# Patient Record
Sex: MALE | Race: BLACK OR AFRICAN AMERICAN | Employment: FULL TIME | URBAN - METROPOLITAN AREA
[De-identification: names, ages, dates, MRNs, and addresses within clinical notes are randomized per-mention and may not be internally consistent; named-entity substitution may affect disease eponyms.]

---

## 2017-01-30 ENCOUNTER — COMMUNICATION - HEALTHEAST (OUTPATIENT)
Dept: TELEHEALTH | Facility: CLINIC | Age: 27
End: 2017-01-30

## 2017-01-30 ENCOUNTER — OFFICE VISIT - HEALTHEAST (OUTPATIENT)
Dept: INTERNAL MEDICINE | Facility: CLINIC | Age: 27
End: 2017-01-30

## 2017-01-30 ENCOUNTER — COMMUNICATION - HEALTHEAST (OUTPATIENT)
Dept: INTERNAL MEDICINE | Facility: CLINIC | Age: 27
End: 2017-01-30

## 2017-01-30 DIAGNOSIS — G89.29 CHRONIC MIDLINE LOW BACK PAIN WITH LEFT-SIDED SCIATICA: ICD-10-CM

## 2017-01-30 DIAGNOSIS — M54.42 CHRONIC MIDLINE LOW BACK PAIN WITH LEFT-SIDED SCIATICA: ICD-10-CM

## 2017-01-30 DIAGNOSIS — Z72.0 TOBACCO USE: ICD-10-CM

## 2017-01-30 DIAGNOSIS — Z13.1 DIABETES MELLITUS SCREENING: ICD-10-CM

## 2017-01-30 ASSESSMENT — MIFFLIN-ST. JEOR: SCORE: 1827.38

## 2019-03-14 ENCOUNTER — THERAPY VISIT (OUTPATIENT)
Dept: PHYSICAL THERAPY | Facility: CLINIC | Age: 29
End: 2019-03-14
Payer: COMMERCIAL

## 2019-03-14 ENCOUNTER — OFFICE VISIT (OUTPATIENT)
Dept: FAMILY MEDICINE | Facility: CLINIC | Age: 29
End: 2019-03-14
Payer: COMMERCIAL

## 2019-03-14 VITALS
TEMPERATURE: 98.2 F | WEIGHT: 201 LBS | OXYGEN SATURATION: 96 % | HEART RATE: 76 BPM | SYSTOLIC BLOOD PRESSURE: 119 MMHG | HEIGHT: 67 IN | BODY MASS INDEX: 31.55 KG/M2 | DIASTOLIC BLOOD PRESSURE: 71 MMHG | RESPIRATION RATE: 16 BRPM

## 2019-03-14 DIAGNOSIS — M54.41 CHRONIC RIGHT-SIDED LOW BACK PAIN WITH RIGHT-SIDED SCIATICA: ICD-10-CM

## 2019-03-14 DIAGNOSIS — G89.29 CHRONIC RIGHT-SIDED LOW BACK PAIN WITH RIGHT-SIDED SCIATICA: ICD-10-CM

## 2019-03-14 DIAGNOSIS — M54.41 CHRONIC RIGHT-SIDED LOW BACK PAIN WITH RIGHT-SIDED SCIATICA: Primary | ICD-10-CM

## 2019-03-14 DIAGNOSIS — G89.29 CHRONIC RIGHT-SIDED LOW BACK PAIN WITH RIGHT-SIDED SCIATICA: Primary | ICD-10-CM

## 2019-03-14 PROCEDURE — 97110 THERAPEUTIC EXERCISES: CPT | Mod: GP | Performed by: PHYSICAL THERAPIST

## 2019-03-14 PROCEDURE — 97161 PT EVAL LOW COMPLEX 20 MIN: CPT | Mod: GP | Performed by: PHYSICAL THERAPIST

## 2019-03-14 PROCEDURE — 99203 OFFICE O/P NEW LOW 30 MIN: CPT | Performed by: PHYSICIAN ASSISTANT

## 2019-03-14 RX ORDER — METHYLPREDNISOLONE 4 MG
TABLET, DOSE PACK ORAL
Qty: 21 TABLET | Refills: 0 | Status: SHIPPED | OUTPATIENT
Start: 2019-03-14 | End: 2019-09-05

## 2019-03-14 SDOH — HEALTH STABILITY: MENTAL HEALTH: HOW OFTEN DO YOU HAVE A DRINK CONTAINING ALCOHOL?: NEVER

## 2019-03-14 ASSESSMENT — MIFFLIN-ST. JEOR: SCORE: 1834.32

## 2019-03-14 NOTE — NURSING NOTE
"Chief Complaint   Patient presents with     Back Pain     initial /71 (BP Location: Right arm, Cuff Size: Adult Large)   Pulse 76   Temp 98.2  F (36.8  C) (Oral)   Resp 16   Ht 1.708 m (5' 7.25\")   Wt 91.2 kg (201 lb)   SpO2 96%   BMI 31.25 kg/m   Estimated body mass index is 31.25 kg/m  as calculated from the following:    Height as of this encounter: 1.708 m (5' 7.25\").    Weight as of this encounter: 91.2 kg (201 lb).  BP completed using cuff size: large.  R arm      Health Maintenance that is potentially due pending provider review:  NONE    n/a    Jarett Limon ma  "

## 2019-03-14 NOTE — PROGRESS NOTES
SUBJECTIVE:   Shilo Riddle is a 28 year old male who presents to clinic today for the following health issues:      Back Pain       Duration: years ago        Specific cause: lifting    Description:   Location of pain: low back right  Character of pain: sharp, stabbing and waxing and waning  Pain radiation:radiates into the right leg  New numbness or weakness in legs, not attributed to pain:  YES    Intensity: Currently 7/10, moderate    History:   Pain interferes with job: YES,   History of back problems: recurrent self limited episodes of low back pain in the past  Any previous MRI or X-rays: None  Sees a specialist for back pain:  No  Therapies tried without relief: acetaminophen (Tylenol)    Alleviating factors:   Improved by: rest      Precipitating factors:  Worsened by: Standing and Sitting          Accompanying Signs & Symptoms:  Risk of Fracture:  None  Risk of Cauda Equina:  None  Risk of Infection:  None  Risk of Cancer:  None  Risk of Ankylosing Spondylitis:  Onset at age <35, male, AND morning back stiffness. no           problem list and histories reviewed & adjusted, as indicated.  Additional history: 27 y/o NP male here for evaluation of ongoing low back pain.  This started after a work injury at a job where he did lift wrong.  He never returned to that job.  It did seem to improved with time.  At the end of 2017, he did get evaluated for his low back.  Was given back brace, but not much else was done.  He has not seen anyone else for this.      Over the last few weeks, his low back pain has been building.  Does lift, twist, turn and do overhead lifting through work.  He has done a bit of tylenol, and mild massage.  He has left early on several visit through work.      Pain is 90% on right side.  Pain can radiate past knee sometimes, but not usually.    BP Readings from Last 3 Encounters:   03/14/19 119/71    Wt Readings from Last 3 Encounters:   03/14/19 91.2 kg (201 lb)                 "    Reviewed and updated as needed this visit by clinical staff  Tobacco  Allergies  Meds  Soc Hx      Reviewed and updated as needed this visit by Provider         ROS:  Constitutional, HEENT, cardiovascular, pulmonary, gi and gu systems are negative, except as otherwise noted.    OBJECTIVE:     /71 (BP Location: Right arm, Cuff Size: Adult Large)   Pulse 76   Temp 98.2  F (36.8  C) (Oral)   Resp 16   Ht 1.708 m (5' 7.25\")   Wt 91.2 kg (201 lb)   SpO2 96%   BMI 31.25 kg/m    Body mass index is 31.25 kg/m .  GENERAL: alert and no distress  EYES: Eyes grossly normal to inspection  RESP: lungs clear to auscultation - no rales, rhonchi or wheezes  CV: regular rate and rhythm, normal S1 S2, no S3 or S4, no murmur, click or rub, no peripheral edema and peripheral pulses strong  MS: limited active ROM in lumbar spine with flexion and rotation.  Mild para pumar tenderness right side into piriformis.  Negative straight leg raise.  Grossly normal sensation and strength distally    Diagnostic Test Results:  none     ASSESSMENT/PLAN:             1. Chronic right-sided low back pain with right-sided sciatica  Work note provided.  Will trial medrol burst to help with acute pain, and will get him into PHYSICAL THERAPY for for evaluation and treatment.  Anticipate [place] paperwork.  - [facility] PT, HAND, AND CHIROPRACTIC REFERRAL; Future  - methylPREDNISolone (MEDROL DOSEPAK) 4 MG tablet therapy pack; Follow Package Directions  Dispense: 21 tablet; Refill: 0        Jordon Flores PA-C  [facility]    "

## 2019-03-14 NOTE — PROGRESS NOTES
[facility] Initial Evaluation  Subjective:  The history is provided by the patient. No  was used.   Shilo Riddle is a 28 year old male with a lumbar condition.  Condition occurred with:  Lifting.  Condition occurred: at work.  This is a chronic condition  Patient originally injured back lifting a heavy box, but that was 5 years ago.  He did not return to that job though and this is not work comp.  The pain has continued, but varied over the past 5 years.  It worsened last night.  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  It also has forced overtime.  It involves constant movement and lifting.  So he is trying to get set up with an MD and treatment for his back.  He mentions that his mom and brother both just had back surgery and he's wondering if back problems can be hereditary.     As for exercise, he occasionally lifts upper body and walks on TM, but not real consistently lately.  He also works on his abs..    Patient reports pain:  Lumbar spine right.  Radiates to:  Gluteals right, lower leg right, knee right and foot right.  Pain is described as sharp and is intermittent Pain Scale: 7-10/10.     Symptoms are exacerbated by lifting, carrying, twisting and walking (constant pain while standing, lying without pillows under his legs) and relieved by NSAID's (stretching into flexion).  Since onset symptoms are gradually worsening.  Special testing: none yet.  Previous treatment: Hasn't tried any therapies yet.    General health as reported by patient is good.                                              Objective:  Standing Alignment:        Lumbar:  Lordosis decr  Pelvic:  Normal    Knee:  Genu varus R and genu varus L (does not like to fully extend knees in standing)      Gait:    Gait Type:  Antalgic   Assistive Devices:  None  Deviations:  Lumbar:  Trunk flexionKnee:  Knee extension decr R and knee  extension decr LGeneral Deviations:  Apurva decr    Flexibility/Screens:       Lower Extremity:  Decreased left lower extremity flexibility:Piriformis; Hamstrings; Gastroc and Soleus    Decreased right lower extremity flexibility:  Piriformis; Hamstrings; Gastroc and Soleus               Lumbar/SI Evaluation  ROM:    AROM Lumbar:   Flexion:          Ankles. slow return  Ext:                    25%++   Side Bend:        Left:     Right:   Rotation:           Left:     Right:   Side Glide:        Left:  50%    Right:  50%          Lumbar Myotomes:  normal            Lumbar DTR's:    L4 (Quad):  Left:  1   Right:  1  S1 (Achilles):  Left:  2   Right:  2      Neural Tension/Mobility:      Left side:SLR  negative.   Right side:   SLR positive.  Lumbar Palpation:      Tenderness not present at Left:    Quadratus Lumborum or Erector Spinae  Tenderness present at Right: Erector Spinae  Tenderness not present at Right:  Quadratus Lumborum                                                     General     ROS    Assessment/Plan:    Patient is a 28 year old male with lumbar complaints.    Patient has the following significant findings with corresponding treatment plan.                Diagnosis 1:  R LBP with right sciatica  Pain -  hot/cold therapy, manual therapy, self management, education and home program  Decreased ROM/flexibility - manual therapy, therapeutic exercise and home program  Impaired gait - gait training and home program  Impaired muscle performance - neuro re-education and home program  Decreased function - therapeutic activities and home program  Impaired posture - neuro re-education and home program    Therapy Evaluation Codes:   1) History comprised of:   Personal factors that impact the plan of care:      None.    Comorbidity factors that impact the plan of care are:      None.     Medications impacting care: None.  2) Examination of Body Systems comprised of:   Body structures and functions that impact the  plan of care:      Lumbar spine.   Activity limitations that impact the plan of care are:      Lifting, Standing, Walking, Working and Sleeping.  3) Clinical presentation characteristics are:   Evolving/Changing.  4) Decision-Making    Low complexity using standardized patient assessment instrument and/or measureable assessment of functional outcome.  Cumulative Therapy Evaluation is: Low complexity.    Previous and current functional limitations:  (See Goal Flow Sheet for this information)    Short term and Long term goals: (See Goal Flow Sheet for this information)     Communication ability:  Patient appears to be able to clearly communicate and understand verbal and written communication and follow directions correctly.  Treatment Explanation - The following has been discussed with the patient:   RX ordered/plan of care  Anticipated outcomes  Possible risks and side effects  This patient would benefit from PT intervention to resume normal activities.   Rehab potential is good.    Frequency:  1 X week, once daily  Duration:  for 4 weeks tapering to 2 X a month over 8 weeks  Discharge Plan:  Achieve all LTG.  Independent in home treatment program.  Reach maximal therapeutic benefit.    Please refer to the daily flowsheet for treatment today, total treatment time and time spent performing 1:1 timed codes.

## 2019-03-14 NOTE — LETTER
[facility]  [address]  [facility] [address]  Phone: [phone]    March 14, 2019        Shilo Riddle  [address]  [facility] [address]          To whom it may concern:    RE: Shilo Riddle    Patient was seen and treated today at our clinic and missed work 3/14.  We will also be filling out intermittent FMLA when we get a copy of the paperwork    Please contact me for questions or concerns.      Sincerely,        Jordon Flores PA-C

## 2019-03-15 ENCOUNTER — TELEPHONE (OUTPATIENT)
Dept: FAMILY MEDICINE | Facility: CLINIC | Age: 29
End: 2019-03-15

## 2019-03-15 NOTE — TELEPHONE ENCOUNTER
Forms received from [facility] for completion and signature  Placed forms:  In your box  Forms need to be faxed and picked up [phone]      Patient call? Yes  Copy sent to scanning? yes    All.JULIA Tran

## 2019-03-20 NOTE — PROGRESS NOTES
[facility] Initial Evaluation  Subjective:                                       Past medical history: Numbness/tingling.          Employment status: .  Primary job tasks include:  Lifting, operating a machine, prolonged standing and repetitive tasks (Pushing/pulling).                                Objective:  System    Physical Exam    General     ROS    Assessment/Plan:

## 2019-03-22 NOTE — TELEPHONE ENCOUNTER
Notified patient that form is completed and has been faxed. Left form at  for patient   Sissy Gao CMA on 3/22/2019 at 8:49 AM

## 2019-03-23 ENCOUNTER — THERAPY VISIT (OUTPATIENT)
Dept: PHYSICAL THERAPY | Facility: CLINIC | Age: 29
End: 2019-03-23
Payer: COMMERCIAL

## 2019-03-23 DIAGNOSIS — M54.41 CHRONIC RIGHT-SIDED LOW BACK PAIN WITH RIGHT-SIDED SCIATICA: ICD-10-CM

## 2019-03-23 DIAGNOSIS — G89.29 CHRONIC RIGHT-SIDED LOW BACK PAIN WITH RIGHT-SIDED SCIATICA: ICD-10-CM

## 2019-03-23 PROCEDURE — 97140 MANUAL THERAPY 1/> REGIONS: CPT | Mod: GP | Performed by: PHYSICAL THERAPIST

## 2019-03-23 PROCEDURE — 97110 THERAPEUTIC EXERCISES: CPT | Mod: GP | Performed by: PHYSICAL THERAPIST

## 2019-04-30 PROBLEM — M54.41 CHRONIC RIGHT-SIDED LOW BACK PAIN WITH RIGHT-SIDED SCIATICA: Status: RESOLVED | Noted: 2019-03-14 | Resolved: 2019-04-30

## 2019-04-30 PROBLEM — G89.29 CHRONIC RIGHT-SIDED LOW BACK PAIN WITH RIGHT-SIDED SCIATICA: Status: RESOLVED | Noted: 2019-03-14 | Resolved: 2019-04-30

## 2019-04-30 NOTE — PROGRESS NOTES
Subjective:  HPI                    Objective:  System    Physical Exam    General     ROS    Assessment/Plan:    DISCHARGE REPORT    Progress reporting period is from 3/14/19 to 3/23/19.       SUBJECTIVE  Subjective changes noted by patient: Patient last seen on 3/23/19.  Patient reports he still gets the fire pain in the back after 6 hours into his work shift.  He has been doing some of his stretches in the locker room at work and really likes the rotation stretch.  He mentions that his moms back issue was a cyst in her back and he says he is built like her and with a large back side and it seems to force him to walk with lots of tension in his back.    Current Pain level: (7-10/10).     Initial Pain level: (7-10/10).   Changes in function:  None  Adverse reaction to treatment or activity: activity - working    OBJECTIVE  Changes noted in objective findings:  Patient has failed to return to therapy so current objective findings are unknown.    Still 9/10 pain with standing/walking>30 min.    Walks holding upper body VERY stiff and slight lean forward.     ASSESSMENT/PLAN  Updated problem list and treatment plan: Diagnosis 1:  R LBP with right sciatica  Pain -  hot/cold therapy, manual therapy, self management, education, directional preference exercise and home program  Decreased ROM/flexibility - manual therapy, therapeutic exercise and home program  Decreased strength - therapeutic exercise, therapeutic activities and home program  Impaired muscle performance - neuro re-education and home program  Decreased function - therapeutic activities and home program  Impaired posture - neuro re-education and home program  STG/LTGs have been met or progress has been made towards goals:    Assessment of Progress: The patient has not returned to therapy. Current status is unknown.  Self Management Plans:  Patient has been instructed in a home treatment program.  Patient  has been instructed in self management of  symptoms.    [facility] continues to require the following intervention to meet STG and LTG's:  PT was suggested, but patient hasn't returned    Recommendations:  discontinue PT due to patient not returning.    Please refer to the daily flowsheet for treatment today, total treatment time and time spent performing 1:1 timed codes.

## 2019-07-02 ENCOUNTER — OFFICE VISIT (OUTPATIENT)
Dept: FAMILY MEDICINE | Facility: CLINIC | Age: 29
End: 2019-07-02
Payer: COMMERCIAL

## 2019-07-02 VITALS
DIASTOLIC BLOOD PRESSURE: 77 MMHG | WEIGHT: 176.7 LBS | SYSTOLIC BLOOD PRESSURE: 126 MMHG | HEIGHT: 68 IN | HEART RATE: 58 BPM | BODY MASS INDEX: 26.78 KG/M2 | TEMPERATURE: 98.7 F | OXYGEN SATURATION: 100 %

## 2019-07-02 DIAGNOSIS — M54.41 CHRONIC RIGHT-SIDED LOW BACK PAIN WITH RIGHT-SIDED SCIATICA: Primary | ICD-10-CM

## 2019-07-02 DIAGNOSIS — G89.29 CHRONIC RIGHT-SIDED LOW BACK PAIN WITH RIGHT-SIDED SCIATICA: Primary | ICD-10-CM

## 2019-07-02 PROCEDURE — 99213 OFFICE O/P EST LOW 20 MIN: CPT | Performed by: PHYSICIAN ASSISTANT

## 2019-07-02 RX ORDER — NAPROXEN 500 MG/1
500 TABLET ORAL 2 TIMES DAILY WITH MEALS
Qty: 60 TABLET | Refills: 1 | Status: SHIPPED | OUTPATIENT
Start: 2019-07-02 | End: 2020-10-02

## 2019-07-02 ASSESSMENT — MIFFLIN-ST. JEOR: SCORE: 1736.01

## 2019-07-02 NOTE — PROGRESS NOTES
"Subjective     Shilo Riddle is a 28 year old male who presents to clinic today for the following health issues:    HPI   Back Pain       Duration: Couple years on and off         Specific cause: lifting    Description:   Location of pain: low back right  Character of pain: sharp  Pain radiation:radiates into the right buttocks  New numbness or weakness in legs, not attributed to pain:  YES    Intensity: Currently 5/10, At its worst 10/10    History:   Pain interferes with job: YES  History of back problems: no prior back problems  Any previous MRI or X-rays: None  Sees a specialist for back pain:  No  Therapies tried without relief: cold, heat, massage and muscle relaxants    Alleviating factors:   Improved by: Trying to find angels       Precipitating factors:  Worsened by: Lifting, Bending and Standing                     27 y/o male here for flare of his low back.  I did see him earlier this year for similar condition.  This does stem from long hours at work with a lot of lifting, much of it overhead.  He was able to do 2 sessions with PHYSICAL THERAPY, and was helpful, but just did not have time to continue.  Tries to do some of the exercises.  He recently had flare up, as he was forced to work overtime.  Last time we did put some restrictions while at work, he states that work did not really follow those.    BP Readings from Last 3 Encounters:   07/02/19 126/77   03/14/19 119/71    Wt Readings from Last 3 Encounters:   07/02/19 80.2 kg (176 lb 11.2 oz)   03/14/19 91.2 kg (201 lb)                      Reviewed and updated as needed this visit by Provider         Review of Systems   ROS COMP: Constitutional, HEENT, cardiovascular, pulmonary, gi and gu systems are negative, except as otherwise noted.      Objective    /77   Pulse 58   Temp 98.7  F (37.1  C) (Oral)   Ht 1.727 m (5' 8\")   Wt 80.2 kg (176 lb 11.2 oz)   SpO2 100%   BMI 26.87 kg/m    Body mass index is 26.87 kg/m .  Physical Exam " "  GENERAL: alert and no distress  RESP: lungs clear to auscultation - no rales, rhonchi or wheezes  CV: regular rate and rhythm, normal S1 S2, no S3 or S4, no murmur, click or rub, no peripheral edema and peripheral pulses strong  MS: limited active ROM in lumbar spine.  Tender right para spinal muscle.  Non tender spinous process.      Diagnostic Test Results:  Labs reviewed in Epic        Assessment & Plan     1. Chronic right-sided low back pain with right-sided sciatica  Referred to medical spine for continue evaluation and treatment.  Off work restrictions note given.  2- ORTHO  REFERRAL  - naproxen (NAPROSYN) 500 MG tablet; Take 1 tablet (500 mg) by mouth 2 times daily (with meals)  Dispense: 60 tablet; Refill: 1     Tobacco Cessation:   reports that he has been smoking.  He has never used smokeless tobacco.        BMI:   Estimated body mass index is 26.87 kg/m  as calculated from the following:    Height as of this encounter: 1.727 m (5' 8\").    Weight as of this encounter: 80.2 kg (176 lb 11.2 oz).               No follow-ups on file.    Jordon Flores PA-C  [facility]        "

## 2019-07-02 NOTE — LETTER
[facility]  [address]  [facility] [address]  Phone: [phone]    July 2, 2019        Shilo Riddle  [address]  [facility] [address]          To whom it may concern:    RE: Shilo Riddle    Patient was seen and treated today at our clinic.  He was unable to work past regular scheduled hours this am secondary to medical issue.  He is able to return to work tonight but may need intermittent leave based on medical issue.       Please contact me for questions or concerns.      Sincerely,        Jordon Flores PA-C

## 2019-07-18 ENCOUNTER — OFFICE VISIT (OUTPATIENT)
Dept: FAMILY MEDICINE | Facility: CLINIC | Age: 29
End: 2019-07-18
Payer: COMMERCIAL

## 2019-07-18 VITALS
SYSTOLIC BLOOD PRESSURE: 135 MMHG | OXYGEN SATURATION: 98 % | DIASTOLIC BLOOD PRESSURE: 84 MMHG | TEMPERATURE: 97.7 F | HEIGHT: 68 IN | RESPIRATION RATE: 16 BRPM | BODY MASS INDEX: 26.83 KG/M2 | HEART RATE: 52 BPM | WEIGHT: 177 LBS

## 2019-07-18 DIAGNOSIS — G89.29 CHRONIC RIGHT-SIDED LOW BACK PAIN WITH RIGHT-SIDED SCIATICA: Primary | ICD-10-CM

## 2019-07-18 DIAGNOSIS — M54.41 CHRONIC RIGHT-SIDED LOW BACK PAIN WITH RIGHT-SIDED SCIATICA: Primary | ICD-10-CM

## 2019-07-18 PROCEDURE — 99213 OFFICE O/P EST LOW 20 MIN: CPT | Performed by: PHYSICIAN ASSISTANT

## 2019-07-18 RX ORDER — ACETAMINOPHEN 500 MG
500-1000 TABLET ORAL EVERY 6 HOURS PRN
COMMUNITY

## 2019-07-18 ASSESSMENT — MIFFLIN-ST. JEOR: SCORE: 1737.37

## 2019-07-18 NOTE — NURSING NOTE
"Chief Complaint   Patient presents with     Back Pain     /84   Pulse 52   Temp 97.7  F (36.5  C) (Oral)   Resp 16   Ht 1.727 m (5' 8\")   Wt 80.3 kg (177 lb)   SpO2 98%   BMI 26.91 kg/m   Estimated body mass index is 26.91 kg/m  as calculated from the following:    Height as of this encounter: 1.727 m (5' 8\").    Weight as of this encounter: 80.3 kg (177 lb).  bp completed using cuff size: regular       Health Maintenance addressed:  NONE    n/a    Celia Florian MA     "

## 2019-07-18 NOTE — PROGRESS NOTES
"Subjective     Shilo Riddle is a 28 year old male who presents to clinic today for the following health issues:    HPI   Back Pain       Duration: a couple of years ago         Specific cause: work-related    Description:   Location of pain: low back right  Character of pain: sharp and \"electric\"  Pain radiation:radiates into the right leg  New numbness or weakness in legs, not attributed to pain:  YES    Intensity: mild, severe    History:   Pain interferes with job: YES  History of back problems: no prior back problems  Any previous MRI or X-rays: None  Sees a specialist for back pain:  No, but has willis with provider next week   Therapies tried without relief: chiropractor, cold, heat, massage, Physical Therapy and rest    Alleviating factors:   Improved by: nothing       Precipitating factors:  Worsened by: Lifting, Bending and Standing          Accompanying Signs & Symptoms:  Risk of Fracture:  None  Risk of Cauda Equina:  None  Risk of Infection:  None  Risk of Cancer:  None  Risk of Ankylosing Spondylitis:  Onset at age <35, male, AND morning back stiffness. no                29 y/o male here for follow up.  He has been dealing with acute on chronic low back pain since I first met him back in March.  We did fill out intermittent FMLA at that time, but he states it was denies.  He does work long hours, with physical labor.  He can usually perform job, but sometimes they force overtime and he struggles.  He did have an incident where he hurt his wrist on July 3.  At our last visit, I did refer him to medical spine, which he has upcoming OV next week.  He does state that his FMLA will be approved, it just needs to be redone.  He does not need extended leave, just intermittent, and possible restrictions with no lifting >50 lbs overhead, which is how he injured himself.    BP Readings from Last 3 Encounters:   07/18/19 135/84   07/02/19 126/77   03/14/19 119/71    Wt Readings from Last 3 Encounters:   07/18/19 " "80.3 kg (177 lb)   07/02/19 80.2 kg (176 lb 11.2 oz)   03/14/19 91.2 kg (201 lb)                    Reviewed and updated as needed this visit by Provider         Review of Systems   ROS COMP: Constitutional, HEENT, cardiovascular, pulmonary, gi and gu systems are negative, except as otherwise noted.      Objective    /84   Pulse 52   Temp 97.7  F (36.5  C) (Oral)   Resp 16   Ht 1.727 m (5' 8\")   Wt 80.3 kg (177 lb)   SpO2 98%   BMI 26.91 kg/m    Body mass index is 26.91 kg/m .  Physical Exam   GENERAL: alert and no distress  EYES: Eyes grossly normal to inspection  RESP: lungs clear to auscultation - no rales, rhonchi or wheezes  CV: regular rate and rhythm, normal S1 S2, no S3 or S4, no murmur, click or rub, no peripheral edema and peripheral pulses strong  MS: limited active ROM in lumbar spine with some right sided paraspinal tenderness.  PSYCH: mentation appears normal, affect normal/bright    Diagnostic Test Results:  Labs reviewed in Epic        Assessment & Plan     1. Chronic right-sided low back pain with right-sided sciatica  Did fill out his FMLA.  He has follow up next week with specialist for further evaluation and treatment.  They may be able to help with time frame needed for FMLA going forward.       Tobacco Cessation:   reports that he has been smoking.  He has never used smokeless tobacco.        BMI:   Estimated body mass index is 26.91 kg/m  as calculated from the following:    Height as of this encounter: 1.727 m (5' 8\").    Weight as of this encounter: 80.3 kg (177 lb).               Return in about 4 weeks (around 8/15/2019).    Jordon Flores PA-C  [facility]    "

## 2019-08-26 ENCOUNTER — OFFICE VISIT (OUTPATIENT)
Dept: NEUROSURGERY | Facility: CLINIC | Age: 29
End: 2019-08-26
Attending: NURSE PRACTITIONER
Payer: COMMERCIAL

## 2019-08-26 VITALS
WEIGHT: 184 LBS | TEMPERATURE: 98.2 F | HEART RATE: 59 BPM | OXYGEN SATURATION: 99 % | SYSTOLIC BLOOD PRESSURE: 135 MMHG | DIASTOLIC BLOOD PRESSURE: 84 MMHG | HEIGHT: 68 IN | BODY MASS INDEX: 27.89 KG/M2

## 2019-08-26 DIAGNOSIS — M54.16 LUMBAR RADICULOPATHY: Primary | ICD-10-CM

## 2019-08-26 PROCEDURE — 99203 OFFICE O/P NEW LOW 30 MIN: CPT | Performed by: NURSE PRACTITIONER

## 2019-08-26 PROCEDURE — G0463 HOSPITAL OUTPT CLINIC VISIT: HCPCS

## 2019-08-26 ASSESSMENT — MIFFLIN-ST. JEOR: SCORE: 1769.12

## 2019-08-26 ASSESSMENT — PAIN SCALES - GENERAL: PAINLEVEL: SEVERE PAIN (6)

## 2019-08-26 NOTE — LETTER
8/26/2019         RE: Shilo Riddle  [address]  [facility] [address]        Dear Colleague,    Thank you for referring your patient, Shilo Riddle, to the [facility]UROS[facility]GERY [facility]IC. Please see a copy of my visit note below.    [facility]  Neurosurgery Clinic Visit      CC: low back pain    Primary care Provider: Jordon Flores      Reason For Visit:   I was asked by Jordon Flores PA-C to consult on the patient for chronic right-sided low back pain with right-sided sciatica.      HPI: Shilo Riddle is a 28 year old male with a couple years history of low back pain. He states a few years ago he was at work and bending down to lift and noted a sharp, stabbing low back pain. He was seen and conservative management was recommended. He continues to have flares of low back pain that have increased in frequency and intensity. Today he reports right>left low back pain. He denies radicular pain, paresthesias and weakness. He denies bowel/bladder complaints and foot drop. He states the pain is worse with lifting, standing, walking, and movement. He has tried PT without relief of symptoms. He has also tried massage. He has not had any imaging, injections, or surgery.     Pain right now:  5-6    No past medical history on file.    Past Medical History reviewed with patient during visit.    No past surgical history on file.  Past Surgical History reviewed with patient during visit.    Current Outpatient Medications   Medication     acetaminophen (TYLENOL) 500 MG tablet     naproxen (NAPROSYN) 500 MG tablet     methylPREDNISolone (MEDROL DOSEPAK) 4 MG tablet therapy pack     No current facility-administered medications for this visit.        Allergies   Allergen Reactions     Methylprednisolone Nausea       Social History     Socioeconomic History     Marital status: Single     Spouse name: None     Number of children: None     Years of education: None     Highest  education level: None   Occupational History     None   Social Needs     Financial resource strain: None     Food insecurity:     Worry: None     Inability: None     Transportation needs:     Medical: None     Non-medical: None   Tobacco Use     Smoking status: Current Every Day Smoker     Smokeless tobacco: Never Used   Substance and Sexual Activity     Alcohol use: No     Frequency: Never     Drug use: No     Sexual activity: Yes     Partners: Female   Lifestyle     Physical activity:     Days per week: None     Minutes per session: None     Stress: None   Relationships     Social connections:     Talks on phone: None     Gets together: None     Attends L[employer] service: None     Active member of club or organization: None     Attends meetings of clubs or organizations: None     Relationship status: None     Intimate partner violence:     Fear of current or ex partner: None     Emotionally abused: None     Physically abused: None     Forced sexual activity: None   Other Topics Concern     None   Social History Narrative     None       No family history on file.      ROS: 10 point ROS neg other than the symptoms noted above in the HPI.    Vital Signs:       Examination:  Constitutional:  Alert, well nourished, NAD.  Memory: recent and remote memory   HEENT: Normocephalic, atraumatic.   Pulm:  Without shortness of breath   CV:  No pitting edema of BLE.    Neurological:  Awake  Alert  Oriented x 3  Speech clear  Motor exam    Hip Flexor:                Right: 5/5  Left:  5/5  Hip Adductor:             Right:  5/5  Left:  5/5  Hip Abductor:             Right:  5/5  Left:  5/5  Gastroc Soleus:        Right:  5/5  Left:  5/5  Tib/Ant:                      Right:  5/5  Left:  5/5  EHL:                          Right:  5/5  Left:  5/5   Sensation normal to bilateral upper and lower extremities  Muscle tone to bilateral upper and lower extremities   Gait: Able to stand from a seated position. Normal non-antalgic,  non-myelopathic gait.  Able to heel/toe walk without loss of balance    Lumbar examination reveals no tenderness of the spine or paraspinous muscles.  Hip height is symmetrical. Negative SI joint, sciatic notch or greater trochanteric tenderness to palpation bilaterally.  Straight leg raise is negative bilaterally.      Imaging: None    Assessment/Plan:   Shilo Riddle is a 28 year old male with a couple years history of low back pain. He states a few years ago he was at work and bending down to lift and noted a sharp, stabbing low back pain. He was seen and conservative management was recommended. He continues to have flares of low back pain that have increased in frequency and intensity. Today he reports right>left low back pain. He denies radicular pain, paresthesias and weakness. He states the pain is worse with lifting, standing, walking, and movement. He has tried PT without relief of symptoms. He has also tried massage. He has not had any imaging, injections, or surgery. Given progression of symptoms despite conservative management, recommend lumbar MRI. I will contact him with the results. He verbalized understanding and agreement.    Patient Instructions   -Lumbar MRI ordered. I will contact you with the results.  -Please contact the clinic if pain persists at [phone].    SMOKING CESSATION  What's in cigarette smoke? - Cigarette smoke contains over 4,000 chemicals. Nicotine is one of the main ingredients which is an insecticide/herbicide. It is poisonous to our nervous system, increases blood clotting risk, and decreases the body's defenses to fight off infection. Another chemical is Carbon Monoxide is an asphyxiating gas that permanently binds to blood cells and blocks the transport of oxygen. This leads to tissue death and decreases your metabolism. Tar is a chemical that coats your lungs and trachea which impairs new oxygen coming in and carbon dioxide getting out of your body.   How does  smoking impact surgery? - Smoking is particularly hazardous with regards to surgery. Surgery puts stress on the body and a smoker's body is already under strain from these chemicals. Putting the two together, especially for an elective surgery, could be a recipe for disaster. Smoking before and after surgery increases your risk of heart problems, slow wound healing, delayed bone healing, blood clots, wound infection and anesthesia complications.   What are the benefits of quitting? - In 20 minutes your blood pressure will drop back down to normal. In 8 hours the carbon monoxide (a toxic gas) levels in your blood stream will drop by half, and oxygen levels will return to normal. In 48 hours your chance of having a heart attack will have decreased. All nicotine will have left your body. Your sense of taste and smell will return to a normal level. In 72 hours your bronchial tubes will relax, and your energy levels will increase. In 2 weeks your circulation will increase, and it will continue to improve for the next 10 weeks.    Recommendations for elective surgery - Ideally, patients should quit smoking 8 weeks before and at least 2 weeks after elective surgery in order to avoid complications. Simply cutting back on the amount of cigarettes smoked per day does not offer any benefit or decrease the risk of poor wound healing, heart problems, and infection. Smokers should also start taking Vitamin C and B for two weeks before surgery and two weeks after surgery.    Ways to Stop Smokin. Nicotine patches, lozenges, or gum  2. Support Groups  3. Medications (see below)    List of Medications:  1. Varenicline Tartrate (CHANTIX)   2. Bupropion HCL (WELLBUTRIN, ZYBAN) - note: make sure Wellbutrin is for smoking cessation and not other issues   3. Nicotine Patch (NICODERM)   4. Nicotine Inhaler (NICOTROL)   5. Nicotine Gum Nicotine Polacrilex   6. Nicotine Lozenge: Nicotine Polacrilex (COMMIT)   * [facility] offers a  smoking support group as well!  Please visit: [email or web address]  If you are interested in these, ask about getting a prescription or talk to your primary care doctor about what may be the best way for you to quit.           Senait Flores CNP  [facility]  [address] [address]    Tel [phone]  Pager [phone]      Again, thank you for allowing me to participate in the care of your patient.        Sincerely,        Senait Flores, BINDU

## 2019-08-26 NOTE — PROGRESS NOTES
[facility]  Neurosurgery Clinic Visit      CC: low back pain    Primary care Provider: Jordon Flores      Reason For Visit:   I was asked by Jordon Flores PA-C to consult on the patient for chronic right-sided low back pain with right-sided sciatica.      HPI: Shilo Riddle is a 28 year old male with a couple years history of low back pain. He states a few years ago he was at work and bending down to lift and noted a sharp, stabbing low back pain. He was seen and conservative management was recommended. He continues to have flares of low back pain that have increased in frequency and intensity. Today he reports right>left low back pain. He denies radicular pain, paresthesias and weakness. He denies bowel/bladder complaints and foot drop. He states the pain is worse with lifting, standing, walking, and movement. He has tried PT without relief of symptoms. He has also tried massage. He has not had any imaging, injections, or surgery.     Pain right now:  5-6    No past medical history on file.    Past Medical History reviewed with patient during visit.    No past surgical history on file.  Past Surgical History reviewed with patient during visit.    Current Outpatient Medications   Medication     acetaminophen (TYLENOL) 500 MG tablet     naproxen (NAPROSYN) 500 MG tablet     methylPREDNISolone (MEDROL DOSEPAK) 4 MG tablet therapy pack     No current facility-administered medications for this visit.        Allergies   Allergen Reactions     Methylprednisolone Nausea       Social History     Socioeconomic History     Marital status: Single     Spouse name: None     Number of children: None     Years of education: None     Highest education level: None   Occupational History     None   Social Needs     Financial resource strain: None     Food insecurity:     Worry: None     Inability: None     Transportation needs:     Medical: None     Non-medical: None   Tobacco Use     Smoking  status: Current Every Day Smoker     Smokeless tobacco: Never Used   Substance and Sexual Activity     Alcohol use: No     Frequency: Never     Drug use: No     Sexual activity: Yes     Partners: Female   Lifestyle     Physical activity:     Days per week: None     Minutes per session: None     Stress: None   Relationships     Social connections:     Talks on phone: None     Gets together: None     Attends [employer] service: None     Active member of club or organization: None     Attends meetings of clubs or organizations: None     Relationship status: None     Intimate partner violence:     Fear of current or ex partner: None     Emotionally abused: None     Physically abused: None     Forced sexual activity: None   Other Topics Concern     None   Social History Narrative     None       No family history on file.      ROS: 10 point ROS neg other than the symptoms noted above in the HPI.    Vital Signs:       Examination:  Constitutional:  Alert, well nourished, NAD.  Memory: recent and remote memory   HEENT: Normocephalic, atraumatic.   Pulm:  Without shortness of breath   CV:  No pitting edema of BLE.    Neurological:  Awake  Alert  Oriented x 3  Speech clear  Motor exam    Hip Flexor:                Right: 5/5  Left:  5/5  Hip Adductor:             Right:  5/5  Left:  5/5  Hip Abductor:             Right:  5/5  Left:  5/5  Gastroc Soleus:        Right:  5/5  Left:  5/5  Tib/Ant:                      Right:  5/5  Left:  5/5  EHL:                          Right:  5/5  Left:  5/5   Sensation normal to bilateral upper and lower extremities  Muscle tone to bilateral upper and lower extremities   Gait: Able to stand from a seated position. Normal non-antalgic, non-myelopathic gait.  Able to heel/toe walk without loss of balance    Lumbar examination reveals no tenderness of the spine or paraspinous muscles.  Hip height is symmetrical. Negative SI joint, sciatic notch or greater trochanteric tenderness to palpation  bilaterally.  Straight leg raise is negative bilaterally.      Imaging: None    Assessment/Plan:   Shilo Riddle is a 28 year old male with a couple years history of low back pain. He states a few years ago he was at work and bending down to lift and noted a sharp, stabbing low back pain. He was seen and conservative management was recommended. He continues to have flares of low back pain that have increased in frequency and intensity. Today he reports right>left low back pain. He denies radicular pain, paresthesias and weakness. He states the pain is worse with lifting, standing, walking, and movement. He has tried PT without relief of symptoms. He has also tried massage. He has not had any imaging, injections, or surgery. Given progression of symptoms despite conservative management, recommend lumbar MRI. I will contact him with the results. He verbalized understanding and agreement.    Patient Instructions   -Lumbar MRI ordered. I will contact you with the results.  -Please contact the clinic if pain persists at [phone].    SMOKING CESSATION  What's in cigarette smoke? - Cigarette smoke contains over 4,000 chemicals. Nicotine is one of the main ingredients which is an insecticide/herbicide. It is poisonous to our nervous system, increases blood clotting risk, and decreases the body's defenses to fight off infection. Another chemical is Carbon Monoxide is an asphyxiating gas that permanently binds to blood cells and blocks the transport of oxygen. This leads to tissue death and decreases your metabolism. Tar is a chemical that coats your lungs and trachea which impairs new oxygen coming in and carbon dioxide getting out of your body.   How does smoking impact surgery? - Smoking is particularly hazardous with regards to surgery. Surgery puts stress on the body and a smoker's body is already under strain from these chemicals. Putting the two together, especially for an elective surgery, could be a recipe  for disaster. Smoking before and after surgery increases your risk of heart problems, slow wound healing, delayed bone healing, blood clots, wound infection and anesthesia complications.   What are the benefits of quitting? - In 20 minutes your blood pressure will drop back down to normal. In 8 hours the carbon monoxide (a toxic gas) levels in your blood stream will drop by half, and oxygen levels will return to normal. In 48 hours your chance of having a heart attack will have decreased. All nicotine will have left your body. Your sense of taste and smell will return to a normal level. In 72 hours your bronchial tubes will relax, and your energy levels will increase. In 2 weeks your circulation will increase, and it will continue to improve for the next 10 weeks.    Recommendations for elective surgery - Ideally, patients should quit smoking 8 weeks before and at least 2 weeks after elective surgery in order to avoid complications. Simply cutting back on the amount of cigarettes smoked per day does not offer any benefit or decrease the risk of poor wound healing, heart problems, and infection. Smokers should also start taking Vitamin C and B for two weeks before surgery and two weeks after surgery.    Ways to Stop Smokin. Nicotine patches, lozenges, or gum  2. Support Groups  3. Medications (see below)    List of Medications:  1. Varenicline Tartrate (CHANTIX)   2. Bupropion HCL (WELLBUTRIN, ZYBAN) - note: make sure Wellbutrin is for smoking cessation and not other issues   3. Nicotine Patch (NICODERM)   4. Nicotine Inhaler (NICOTROL)   5. Nicotine Gum Nicotine Polacrilex   6. Nicotine Lozenge: Nicotine Polacrilex (COMMIT)   * [facility] offers a smoking support group as well!  Please visit: [email or web address]  If you are interested in these, ask about getting a prescription or talk to your primary care doctor about what may be the best way for you to quit.           Senait Flores  CNP  [facility]  [address]  [address] [place] [id number]    Tel [phone]  Pager [phone]

## 2019-08-26 NOTE — NURSING NOTE
"Shilo Riddle is a 28 year old male who presents for:  Chief Complaint   Patient presents with     Consult For     Chronic right sided low back pain and right sided sciatica.         Initial Vitals:  /84 (BP Location: Right arm, Patient Position: Sitting, Cuff Size: Adult Large)   Pulse 59   Temp 98.2  F (36.8  C) (Oral)   Ht 5' 8\" (1.727 m)   Wt 184 lb (83.5 kg)   SpO2 99%   BMI 27.98 kg/m   Estimated body mass index is 27.98 kg/m  as calculated from the following:    Height as of this encounter: 5' 8\" (1.727 m).    Weight as of this encounter: 184 lb (83.5 kg).. Body surface area is 2 meters squared. BP completed using cuff size: large  Severe Pain (6)        Nursing Comments: Patient states his pain level is at a 5-6 out of 10.         Babs Resendiz, ZULLY\    "

## 2019-08-26 NOTE — PATIENT INSTRUCTIONS
-Lumbar MRI ordered. I will contact you with the results.  -Please contact the clinic if pain persists at [phone].    SMOKING CESSATION  What's in cigarette smoke? - Cigarette smoke contains over 4,000 chemicals. Nicotine is one of the main ingredients which is an insecticide/herbicide. It is poisonous to our nervous system, increases blood clotting risk, and decreases the body's defenses to fight off infection. Another chemical is Carbon Monoxide is an asphyxiating gas that permanently binds to blood cells and blocks the transport of oxygen. This leads to tissue death and decreases your metabolism. Tar is a chemical that coats your lungs and trachea which impairs new oxygen coming in and carbon dioxide getting out of your body.   How does smoking impact surgery? - Smoking is particularly hazardous with regards to surgery. Surgery puts stress on the body and a smoker's body is already under strain from these chemicals. Putting the two together, especially for an elective surgery, could be a recipe for disaster. Smoking before and after surgery increases your risk of heart problems, slow wound healing, delayed bone healing, blood clots, wound infection and anesthesia complications.   What are the benefits of quitting? - In 20 minutes your blood pressure will drop back down to normal. In 8 hours the carbon monoxide (a toxic gas) levels in your blood stream will drop by half, and oxygen levels will return to normal. In 48 hours your chance of having a heart attack will have decreased. All nicotine will have left your body. Your sense of taste and smell will return to a normal level. In 72 hours your bronchial tubes will relax, and your energy levels will increase. In 2 weeks your circulation will increase, and it will continue to improve for the next 10 weeks.    Recommendations for elective surgery - Ideally, patients should quit smoking 8 weeks before and at least 2 weeks after elective surgery in order to avoid  complications. Simply cutting back on the amount of cigarettes smoked per day does not offer any benefit or decrease the risk of poor wound healing, heart problems, and infection. Smokers should also start taking Vitamin C and B for two weeks before surgery and two weeks after surgery.    Ways to Stop Smokin. Nicotine patches, lozenges, or gum  2. Support Groups  3. Medications (see below)    List of Medications:  1. Varenicline Tartrate (CHANTIX)   2. Bupropion HCL (WELLBUTRIN, ZYBAN) - note: make sure Wellbutrin is for smoking cessation and not other issues   3. Nicotine Patch (NICODERM)   4. Nicotine Inhaler (NICOTROL)   5. Nicotine Gum Nicotine Polacrilex   6. Nicotine Lozenge: Nicotine Polacrilex (COMMIT)   * [facility] offers a smoking support group as well!  Please visit: [email or web address]  If you are interested in these, ask about getting a prescription or talk to your primary care doctor about what may be the best way for you to quit.

## 2019-08-27 ENCOUNTER — TELEPHONE (OUTPATIENT)
Dept: NEUROSURGERY | Facility: CLINIC | Age: 29
End: 2019-08-27

## 2019-08-27 ENCOUNTER — HOSPITAL ENCOUNTER (OUTPATIENT)
Dept: MRI IMAGING | Facility: CLINIC | Age: 29
Discharge: HOME | End: 2019-08-27
Attending: NURSE PRACTITIONER | Admitting: NURSE PRACTITIONER
Payer: COMMERCIAL

## 2019-08-27 DIAGNOSIS — M54.16 LUMBAR RADICULOPATHY: ICD-10-CM

## 2019-08-27 DIAGNOSIS — M54.16 LUMBAR RADICULOPATHY: Primary | ICD-10-CM

## 2019-08-27 PROCEDURE — 72148 MRI LUMBAR SPINE W/O DYE: CPT

## 2019-08-27 NOTE — LETTER
[facility]   [facility]  [address]  [address]        8/28/19    To Whom it May Concern,      Shilo Riddle was seen at our clinic and is scheduled for an upcoming steroid injection on 8/30/19. Please excuse him from work on this date so he can attend his appointment. Please call our clinic with questions or concerns: [phone]      Sincerely,            Renée Wade PA-C

## 2019-08-27 NOTE — TELEPHONE ENCOUNTER
Name of medication: Prednisone     Dosing: 10MG    Clarification comment: Directions and quantity do not match. See prescription and please advise. Renée Wade PA-C reviewed lumbar MRI for patient of Senait Flores CNP since she it out this week. MRI shows very mild degenerative changes at lower levels of lumbar spine. No surgery recommended. Could try facet joint injections.     Called and spoke to patient regarding above results and recommendations. Pt is in agreement with plan to get lumbar facet injection and order has been placed for pain management referral. No other questions or concerns at this time.

## 2019-08-28 ENCOUNTER — TELEPHONE (OUTPATIENT)
Dept: PALLIATIVE MEDICINE | Facility: CLINIC | Age: 29
End: 2019-08-28

## 2019-08-28 NOTE — TELEPHONE ENCOUNTER
Spoke to patient. He would like a letter to excuse him from work on the day of his ROSE[facility] appt, 8/30/19. Will write letter and email to him at [email or web address]    Advised to call back with questions/concerns.

## 2019-08-28 NOTE — TELEPHONE ENCOUNTER
Pt tried a medrol dose amie and this made her nauseaus.      Should not be an issue w/ the injection.    Alanis Guerin, RN-BSN  [facility]

## 2019-08-28 NOTE — TELEPHONE ENCOUNTER
Pre-screening Questions for Radiology Injections:    Injection to be done at which interventional clinic site? [facility] [place]    Instruct patient to arrive as directed prior to the scheduled appointment time:    [place] minutes before      [place]: 30 minutes before; if IV needed 1 hour before     Procedure ordered by Senait Flores    Procedure ordered? Lumbar ROSEANN        Transforaminal Cervical ROSEANN - Dr. Anastasiya Dinh ONLY    What insurance would patient like us to bill for this procedure? BCBS      Worker's comp or MVA (motor vehicle accident) -Any injection DO NOT SCHEDULE and route to [facility] Mendoza.      [employer] insurance - For SI joint injections, DO NOT SCHEDULE and route [facility] Mendoza.       [employer] - Any injection besides hip/shoulder/knee joint DO NOT SCHEDULE and route to Merced Mendoza. She will obtain PA and call pt back to schedule procedure or notify pt of denial.       [employer]-Route to [facility] for review      IF SCHEDULING IN [place] AND NEEDS A PA, IT IS OKAY TO SCHEDULE. [place] HANDLES THEIR OWN PA'S AFTER THE PATIENT IS SCHEDULED. PLEASE SCHEDULE AT LEAST 1 WEEK OUT SO A PA CAN BE OBTAINED.      Any chance of pregnancy? NO   If YES, do NOT schedule and route to RN pool    Is an  needed? No     Patient has a drive home? (mandatory) YES: INFORMED    Is patient taking any blood thinners (plavix, coumadin, jantoven, warfarin, heparin, pradaxa or dabigatran )? No   If hold needed, do NOT schedule, route to RN pool     Is patient taking any aspirin products (includes Excedrin and Fiorinal)? No     If more than 325mg/day do NOT schedule; route to RN pool     For CERVICAL procedures, hold all aspirin products for 6 days.     Tell pt that if aspirin product is not held for 6 days, the procedure WILL BE cancelled.      Does the patient have a bleeding or clotting disorder? No     If YES, okay to schedule AND route to RN nurse pool    For any patients with platelet count <100,  must be forwarded to provider    Is patient diabetic?  No  If YES, have them bring their glucometer.    Does patient have an active infection or treated for one within the past week? No     Is patient currently taking any antibiotics?  No     For patients on chronic, preventative, or prophylactic antibiotics, procedures may be scheduled.     For patients on antibiotics for active or recent infection:antibiotic course must have been completed for 4 days    Is patient currently taking any steroid medications? (i.e. Prednisone, Medrol)  No     For patients on steroid medications, course must have been completed for 4 days    Reviewed with patient:  If you are started on any steroids or antibiotics between now and your appointment, you must contact us because the procedure may need to be cancelled.  Yes    Is patient actively being treated for cancer or immunocompromised? No  If YES, do NOT schedule and route to RN pool     Are you able to get on and off an exam table with minimal or no assistance? Yes  If NO, do NOT schedule and route to RN pool    Are you able to roll over and lay on your stomach with minimal or no assistance? Yes  If NO, do NOT schedule and route to RN pool     Any allergies to contrast dye, iodine, shellfish, or numbing and steroid medications? Yes - Medrol   If YES, route to RN pool AND add allergy information to appointment notes    Allergies: Methylprednisolone      Has the patient had a flu shot or any other vaccinations within 7 days before or after the procedure.  No     Does patient have an MRI/CT?  YES: MRI  (SI joint, hip injections, lumbar sympathetic blocks, and stellate ganglion blocks do not require an MRI)    Was the MRI done w/in the last 3 years?  Yes    Was MRI done at [facility]? Yes      If not, where was it done? N/A       If MRI was not done at [facility], [facility] or [facility] do NOT schedule and route to nursing.  If pt has an imaging disc, the injection may be scheduled but pt  has to bring disc to appt. If they show up w/out disc the injection cannot be done    Reminders (please tell patient if applicable):       Instructed pt to arrive 30 minutes early for IV start if this is for a cervical procedure, ALL sympathetic (stellate ganglion, hypogastric, or lumbar sympathetic block) and all sedation procedures (RFA, spinal cord stimulation trials).  Not Applicable   -IVs are not routinely placed for Dr. Chowdhury cervical cases   -Dr. Resendez: IVs for cervical ESIs and cervical TBDs (not CMBBs/facet inj)      If NPO for sedation, informed patient that it is okay to take medications with sips of water (except if they are to hold blood thinners).  Not Applicable   *DO take blood pressure medication if it is prescribed*      If this is for a cervical ROSEANN, informed patient that aspirin needs to be held for 6 days.   Not Applicable      For all patients not having spinal cord stimulator (SCS) trials or radiofrequency ablations (RFAs), informed patient:    IV sedation is not provided for this procedure.  If you feel that an oral anti-anxiety medication is needed, you can discuss this further with your referring provider or primary care provider.  The Pain Clinic provider will discuss specifics of what the procedure includes at your appointment.  Most procedures last 10-20 minutes.  We use numbing medications to help with any discomfort during the procedure.  Not Applicable      Do not schedule procedures requiring IV placement in the first appointment of the day or first appointment after lunch. Do NOT schedule at 0745, 0815 or 1245. N/A      For patients 85 or older we recommend having an adult stay w/ them for the remainder of the day.   N/A    Does the patient have any questions?  YES: Patient is wondering about getting a print out of information in regards to the injection to give to his employer prior to injection   Sol Saldivar  [facility]

## 2019-08-28 NOTE — TELEPHONE ENCOUNTER
Patient called, to request Work letter, to attend Appointment 08/30/2019 in order to received ROSEANN.    Ph. [phone]    Please advise.

## 2019-08-30 ENCOUNTER — HOSPITAL ENCOUNTER (OUTPATIENT)
Dept: GENERAL RADIOLOGY | Facility: CLINIC | Age: 29
End: 2019-08-30
Attending: PHYSICAL MEDICINE & REHABILITATION
Payer: COMMERCIAL

## 2019-08-30 ENCOUNTER — HOSPITAL ENCOUNTER (OUTPATIENT)
Facility: CLINIC | Age: 29
Discharge: HOME | End: 2019-08-30
Attending: PHYSICAL MEDICINE & REHABILITATION | Admitting: PHYSICAL MEDICINE & REHABILITATION
Payer: COMMERCIAL

## 2019-08-30 VITALS — HEART RATE: 53 BPM | OXYGEN SATURATION: 100 % | SYSTOLIC BLOOD PRESSURE: 119 MMHG | DIASTOLIC BLOOD PRESSURE: 68 MMHG

## 2019-08-30 VITALS — SYSTOLIC BLOOD PRESSURE: 125 MMHG | HEART RATE: 55 BPM | DIASTOLIC BLOOD PRESSURE: 73 MMHG | RESPIRATION RATE: 18 BRPM

## 2019-08-30 DIAGNOSIS — M54.16 LUMBAR RADICULOPATHY: ICD-10-CM

## 2019-08-30 PROCEDURE — 25000125 ZZHC RX 250: Performed by: PHYSICAL MEDICINE & REHABILITATION

## 2019-08-30 PROCEDURE — 64494 INJ PARAVERT F JNT L/S 2 LEV: CPT

## 2019-08-30 PROCEDURE — 40000863 ZZH STATISTIC RADIOLOGY XRAY, US, CT, MAR, NM

## 2019-08-30 PROCEDURE — 25000128 H RX IP 250 OP 636: Performed by: PHYSICAL MEDICINE & REHABILITATION

## 2019-08-30 PROCEDURE — 25500064 ZZH RX 255 OP 636: Performed by: PHYSICAL MEDICINE & REHABILITATION

## 2019-08-30 RX ORDER — TRIAMCINOLONE ACETONIDE 40 MG/ML
40 INJECTION, SUSPENSION INTRA-ARTICULAR; INTRAMUSCULAR ONCE
Status: COMPLETED | OUTPATIENT
Start: 2019-08-30 | End: 2019-08-30

## 2019-08-30 RX ORDER — IOPAMIDOL 408 MG/ML
10 INJECTION, SOLUTION INTRATHECAL ONCE
Status: COMPLETED | OUTPATIENT
Start: 2019-08-30 | End: 2019-08-30

## 2019-08-30 RX ORDER — LIDOCAINE HYDROCHLORIDE 10 MG/ML
30 INJECTION, SOLUTION EPIDURAL; INFILTRATION; INTRACAUDAL; PERINEURAL ONCE
Status: COMPLETED | OUTPATIENT
Start: 2019-08-30 | End: 2019-08-30

## 2019-08-30 RX ORDER — BUPIVACAINE HYDROCHLORIDE 5 MG/ML
30 INJECTION, SOLUTION EPIDURAL; INTRACAUDAL ONCE
Status: COMPLETED | OUTPATIENT
Start: 2019-08-30 | End: 2019-08-30

## 2019-08-30 RX ADMIN — BUPIVACAINE HYDROCHLORIDE 1 MG: 5 INJECTION, SOLUTION EPIDURAL; INTRACAUDAL; PERINEURAL at 08:19

## 2019-08-30 RX ADMIN — IOPAMIDOL 0.5 ML: 408 INJECTION, SOLUTION INTRATHECAL at 08:19

## 2019-08-30 RX ADMIN — LIDOCAINE HYDROCHLORIDE 4 ML: 10 INJECTION, SOLUTION EPIDURAL; INFILTRATION; INTRACAUDAL; PERINEURAL at 08:15

## 2019-08-30 RX ADMIN — TRIAMCINOLONE ACETONIDE 40 MG: 40 INJECTION, SUSPENSION INTRA-ARTICULAR; INTRAMUSCULAR at 08:19

## 2019-08-30 NOTE — PROGRESS NOTES
Care Suites Discharge Nursing Note    Education/questions answered: yes, discontinue instructions given  Patient DC location: to car with family  Accompanied by: family, wife or signif other. Pt denies pain. Able to walk. Site CDI.   CS discharge time: 0851

## 2019-08-30 NOTE — DISCHARGE INSTRUCTIONS
[facility] Procedure Discharge Instructions    Today you saw:  -  Dr Dk Kenney    Procedure:  Facet Joint Injection  -     Medications used:  Lidocaine  -  Bupivacaine  -  -  Kenalog -   -  Isovue M200  -     After you go home:      You may resume your normal diet    It is recommended that a responsible adult stay with you for 6 hours if you received sedation       If you received sedation before, during or after your procedure:      For 24 hours -     Relax and take it easy    Do NOT make any important or legal decisions    Do NOT drive or operate machines at home or at work    Do NOT drink alcohol    Care of Puncture Site:      If you have a bandaid on your puncture site, you may remove it the next morning    You may shower     No bath tubs, whirlpools or swimming for 24 hours after your procedure     Activity:      You may go back to normal activity in 24 hours    Avoid strenuous activity for the first 24 hours.    Be cautious when walking. Numbness and/or weakness in the lower extremities may occur for up to 6-8 hours after the procedure due to effect of the numbing medication used.    Do not drive for 6 hours.  The effect of the numbing medication could slow your reflexes.    Medicines:      You may resume all medications    Resume your Warfarin/Coumadin at your regular dose tonight. Follow up with your provider to have your INR rechecked    Resume your Plavix/Clopidogrel and Aspirin in 12 hours    If you are taking a different blood thinner, follow the directions provided by the Pain Clinic RN for restarting the medication    For minor pain, you may use anti-inflammatory medications - (such as Ibuprofen, Aleve, Advil) or Acetaminophen (Tylenol) for pain control if necessary.    Pain:       You may have a mild to moderate increase in pain for several days following the injection.    It may take up to 14 days for the steroid medication to start working although you may feel the  effect as early as a few days after the procedure.    You may use ice packs for 10-15 minutes, 3 to 4 times a day at the injection site for comfort.    Do not use heat to painful areas for 6 to 8 hours.  This will give the numbing medication time to wear off and prevent you from accidentally burning your skin.    Call the Pain Clinic if you experience any of the following:      You have chills or a fever greater than 100 F     Swelling, bleeding, redness, drainage, warmth at the injection site    Progressive weakness or numbness in your legs or arms    If lumbar, call if you have a loss of bowel or bladder function    If cervical, call if you have any unusual headache that is not relieved by Tylenol or other pain medication    Unusual new onset of pain that is not improving    Other Instructions:      If you are diabetic, check your blood sugar more frequently than usual as your blood sugar may be higher than normal for 10-14 days following a steroid injection.  Contact the provider who manages your diabetes to help you control your blood sugar if needed.      If you have questions call:        Pain Clinic @ [phone] during business hours  Monday-Thursday 8 AM-5:30 PM and Friday 8 AM-4:30 PM    Provider Line @ [phone] after hours

## 2019-08-30 NOTE — PROGRESS NOTES
Care Suites Post-Procedure Note    Procedure: facet inj  CS arrival time: 0835  Accompanied by: wife  Concerns/abnormal assessment after procedure: denies pain. VSS,. Site CDI. Just some numbness in right leg. Given juice and crackers.  Plan: give discharge instructions

## 2019-09-05 ENCOUNTER — OFFICE VISIT (OUTPATIENT)
Dept: FAMILY MEDICINE | Facility: CLINIC | Age: 29
End: 2019-09-05
Payer: COMMERCIAL

## 2019-09-05 VITALS
BODY MASS INDEX: 28.49 KG/M2 | OXYGEN SATURATION: 99 % | WEIGHT: 188 LBS | RESPIRATION RATE: 18 BRPM | HEART RATE: 53 BPM | TEMPERATURE: 98.1 F | SYSTOLIC BLOOD PRESSURE: 132 MMHG | DIASTOLIC BLOOD PRESSURE: 84 MMHG | HEIGHT: 68 IN

## 2019-09-05 DIAGNOSIS — G89.29 CHRONIC RIGHT-SIDED LOW BACK PAIN WITH RIGHT-SIDED SCIATICA: Primary | ICD-10-CM

## 2019-09-05 DIAGNOSIS — M54.41 CHRONIC RIGHT-SIDED LOW BACK PAIN WITH RIGHT-SIDED SCIATICA: Primary | ICD-10-CM

## 2019-09-05 PROCEDURE — 99213 OFFICE O/P EST LOW 20 MIN: CPT | Performed by: PHYSICIAN ASSISTANT

## 2019-09-05 ASSESSMENT — PAIN SCALES - GENERAL: PAINLEVEL: EXTREME PAIN (8)

## 2019-09-05 ASSESSMENT — MIFFLIN-ST. JEOR: SCORE: 1787.26

## 2019-09-05 NOTE — NURSING NOTE
"Chief Complaint   Patient presents with     Back Pain     BP (!) 147/78   Pulse 53   Temp 98.1  F (36.7  C) (Oral)   Resp 18   Ht 1.727 m (5' 8\")   Wt 85.3 kg (188 lb)   SpO2 99%   BMI 28.59 kg/m   Estimated body mass index is 28.59 kg/m  as calculated from the following:    Height as of this encounter: 1.727 m (5' 8\").    Weight as of this encounter: 85.3 kg (188 lb).  bp completed using cuff size: regular       Health Maintenance addressed:  BP was high, used pink card, recheck manually    Possibly completing today per provider review.    Celia Florian, [facility], [place]     "

## 2019-09-05 NOTE — PROGRESS NOTES
"Subjective     Shilo Riddle is a 28 year old male who presents to clinic today for the following health issues:    HPI   Chronic/Recurring Back Pain Follow Up      Where is your back pain located? (Select all that apply) low back bilateral    How would you describe your back pain?  stabbing and \"electric shot pulse\"    Where does your back pain spread? the right buttock, the right  thigh, the right  knee and the right foot    Since your last clinic visit for back pain, how has your pain changed? unchanged    Does your back pain interfere with your job? YES    Since your last visit, have you tried any new treatment? Yes -  steroid inj on Friday         How many servings of fruits and vegetables do you eat daily?  2-3    On average, how many sweetened beverages do you drink each day (soda, juice, sweet tea, etc)?   4    How many days per week do you miss taking your medication? 0      29 y/o male here for follow up.  He has been dealing with some chronic low back pain.    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  He did have immediate success the first day.  He was told to take a week for it to continue to work.  He does feel that he is much better of today than he was prior to this procedure.  He does think he will get the left side done at some point.  He would like to return to work on Sunday, and avoid some of those overhead lifting for 1 week to ease into things.    BP Readings from Last 3 Encounters:   09/05/19 132/84   08/30/19 125/73   08/30/19 119/68    Wt Readings from Last 3 Encounters:   09/05/19 85.3 kg (188 lb)   08/26/19 83.5 kg (184 lb)   07/18/19 80.3 kg (177 lb)                    Reviewed and updated as needed this visit by Provider         Review of Systems   ROS COMP: Constitutional, HEENT, cardiovascular, pulmonary, gi and gu systems are negative, except as otherwise noted.      Objective    /84 " "  Pulse 53   Temp 98.1  F (36.7  C) (Oral)   Resp 18   Ht 1.727 m (5' 8\")   Wt 85.3 kg (188 lb)   SpO2 99%   BMI 28.59 kg/m    Body mass index is 28.59 kg/m .  Physical Exam   GENERAL: alert and no distress  EYES: Eyes grossly normal to inspection  RESP: lungs clear to auscultation - no rales, rhonchi or wheezes  CV: regular rate and rhythm, normal S1 S2, no S3 or S4, no murmur, click or rub, no peripheral edema and peripheral pulses strong  MS: no gross musculoskeletal defects noted, no edema  PSYCH: mentation appears normal, affect normal/bright    Diagnostic Test Results:  Labs reviewed in Epic        Assessment & Plan     1. Chronic right-sided low back pain with right-sided sciatica  Off work 8/25-9/8 and restrictions for 1 week.  If symptoms persist or worsen, will have him follow up with specialty.         Tobacco Cessation:   reports that he has been smoking.  He has never used smokeless tobacco.        BMI:   Estimated body mass index is 28.59 kg/m  as calculated from the following:    Height as of this encounter: 1.727 m (5' 8\").    Weight as of this encounter: 85.3 kg (188 lb).               Return in about 3 months (around 12/5/2019).    Jordon Flores PA-C  [facility]    "

## 2019-09-05 NOTE — LETTER
[facility]  [address]  [place] [address]  Phone: [phone]    September 5, 2019        Shilo Riddle  [address]  [place] [address]          To whom it may concern:    RE: Shilo Riddle    Patient has been off since 8/25 and may return to work 9/8 with the following:  Light duty-unable to lift more than 25 pounds overhead for 1 week and then return to work without restrictions on 8/15.    Please contact me for questions or concerns.      Sincerely,        Jordon Flores PA-C

## 2019-09-09 ENCOUNTER — TELEPHONE (OUTPATIENT)
Dept: FAMILY MEDICINE | Facility: CLINIC | Age: 29
End: 2019-09-09

## 2019-09-09 NOTE — TELEPHONE ENCOUNTER
Reason for Call:  Form, our goal is to have forms completed with 72 hours, however, some forms may require a visit or additional information.    Type of letter, form or note:      Who is the form from?: Patient    Where did the form come from: Patient or family brought in       What clinic location was the form placed at?: [facility]    Where the form was placed: Front-for Balwinder Flores    What number is listed as a contact on the form?: [phone] Lorie Simental       Additional comments: please fax per patient request    Call taken on 9/9/2019 at 8:39 AM by Ashley Arriaga

## 2019-09-10 NOTE — TELEPHONE ENCOUNTER
Received [facility] paperwork, needing completion and PA signature from aBlwinder Flores. Will place in in box for completion.

## 2019-11-06 ENCOUNTER — HEALTH MAINTENANCE LETTER (OUTPATIENT)
Age: 29
End: 2019-11-06

## 2020-10-02 ENCOUNTER — TELEPHONE (OUTPATIENT)
Dept: FAMILY MEDICINE | Facility: CLINIC | Age: 30
End: 2020-10-02

## 2020-10-02 ENCOUNTER — VIRTUAL VISIT (OUTPATIENT)
Dept: FAMILY MEDICINE | Facility: CLINIC | Age: 30
End: 2020-10-02
Payer: COMMERCIAL

## 2020-10-02 DIAGNOSIS — M54.41 CHRONIC RIGHT-SIDED LOW BACK PAIN WITH RIGHT-SIDED SCIATICA: Primary | ICD-10-CM

## 2020-10-02 DIAGNOSIS — G89.29 CHRONIC RIGHT-SIDED LOW BACK PAIN WITH RIGHT-SIDED SCIATICA: Primary | ICD-10-CM

## 2020-10-02 PROCEDURE — 99213 OFFICE O/P EST LOW 20 MIN: CPT | Mod: 95 | Performed by: PHYSICIAN ASSISTANT

## 2020-10-02 NOTE — TELEPHONE ENCOUNTER
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  Placed form(s) in/on JS's desk.  Forms need to be filled out and signed and faxed to number listed on form..    Call pt to verify form was sent: No  Copy needs to be sent for scanning after completion: Yes

## 2020-10-02 NOTE — PROGRESS NOTES
"Shilo Riddle is a 29 year old male who is being evaluated via a billable video visit.      The patient has been notified of following:     \"This video visit will be conducted via a call between you and your physician/provider. We have found that certain health care needs can be provided without the need for an in-person physical exam.  This service lets us provide the care you need with a video conversation.  If a prescription is necessary we can send it directly to your pharmacy.  If lab work is needed we can place an order for that and you can then stop by our lab to have the test done at a later time.    Video visits are billed at different rates depending on your insurance coverage.  Please reach out to your insurance provider with any questions.    If during the course of the call the physician/provider feels a video visit is not appropriate, you will not be charged for this service.\"    Patient has given verbal consent for Video visit? Yes  How would you like to obtain your AVS? [employer]  If you are dropped from the video visit, the video invite should be resent to: Text to cell phone: [phone]  Will anyone else be joining your video visit? No    Subjective     Shilo Riddle is a 29 year old male who presents today via video visit for the following health issues:    HPI     Patient FMLA forms continuing and will upload via [employer]      28 y/o male here for follow up from out last visit.  He does have ongoing low back pain and did have injury at work earlier in the year.  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  He has been working without restrictions for the last 2 months, but his job is very physically demanding with lots of lifting.  He does have to work essentially 6 days a week with some of 7 and does work 10-12.      Video Start Time: 9:30 AM        Review of Systems   Constitutional, HEENT, cardiovascular, pulmonary, gi and gu systems are " negative, except as otherwise noted.      Objective           Vitals:  No vitals were obtained today due to virtual visit.    Physical Exam     GENERAL: Healthy, alert and no distress  EYES: Eyes grossly normal to inspection.  No discharge or erythema, or obvious scleral/conjunctival abnormalities.  RESP: No audible wheeze, cough, or visible cyanosis.  No visible retractions or increased work of breathing.    SKIN: Visible skin clear. No significant rash, abnormal pigmentation or lesions.  NEURO: Cranial nerves grossly intact.  Mentation and speech appropriate for age.  PSYCH: Mentation appears normal, affect normal/bright, judgement and insight intact, normal speech and appearance well-groomed.      No results found for this or any previous visit (from the past 24 hour(s)).        Assessment & Plan     Chronic right-sided low back pain with right-sided sciatica  Will await [facility] paperwork and fill out for intermittent leave based on above history.       Tobacco Cessation:   reports that he has been smoking. He has never used smokeless tobacco.               No follow-ups on file.    Jordon Flores PA-C  [facility]      Video-Visit Details    Type of service:  Video Visit    Video End Time:9:40 AM    Originating Location (pt. Location): Home    Distant Location (provider location):  [facility]     Platform used for Video Visit: Stewart, did have to finish via telephone as his interest was spotty.

## 2020-11-29 ENCOUNTER — HEALTH MAINTENANCE LETTER (OUTPATIENT)
Age: 30
End: 2020-11-29

## 2021-05-30 VITALS — HEIGHT: 68 IN | WEIGHT: 200.8 LBS | BODY MASS INDEX: 30.43 KG/M2

## 2021-06-08 NOTE — PROGRESS NOTES
Internal Medicine Office Visit  Patient Name: Shilo Riddle  Patient Age: 26 y.o.  YOB: 1990  MRN: [id number]  ?  Date of Visit: 2017  Reason for Office Visit:   Chief Complaint   Patient presents with     Pain     back and leg, possible injury from work of summer 2016     Labs Only     fasting for any labwork needed for DM Check       Assessment / Plan / Medical Decision Makin. Chronic midline low back pain with left-sided sciatica  2. Diabetes mellitus screening  3. Tobacco use  - MR Lumbar Spine Without Contrast; Future due to >6 weeks of progressively worsening low back pain with left sided sciatica. Pain is worsening despite conservative measures of care including consistent core exercises and OTC pain relievers including NSAIDs and acetaminophen. Patient may be willing to consider epidural spinal injections, pain is limiting his ability to work and exercise   - Glucose; Future  - JIC LAV  - Glucose  - Positive PHQ-2 patient states is related to his low back pain that causes him to have less interest and pleasure in doing things he would typically enjoyed due to the pain that he experiences.  He denies any symptoms of depression and does not feel that he has any mood concerns         Health Maintenance Review  Health Maintenance   Topic Date Due     ADVANCE DIRECTIVES DISCUSSED WITH PATIENT  2022     TD 18+ HE  2027     INFLUENZA VACCINE RULE BASED  Addressed     TDAP ADULT ONE TIME DOSE  Completed         I am having Mr. Riddle start on naproxen. I am also having him maintain his acetaminophen and DM/P-EPHED/ACETAMINOPH/DOXYLAM (NYQUIL D ORAL).     HPI:   Encounter Diagnoses   Name Primary?     Chronic midline low back pain with left-sided sciatica Yes     Diabetes mellitus screening      Tobacco use       Shilo Riddle is a 26-year-old male who presents to the office today with a complaint of low back pain.  He states that he has had low back pain for quite some  "time but it was never bothersome enough to limit his exercise or work routines.  This summer he was working too very physically intensive jobs.  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  He was working in July and lifting a heavy bag when he felt a sudden twinge in his left low back that he describes \"like electricity\".  He was not seen after this initial injury and assumed that the pain would improve with some conservative methods.  He began working with his brother on doing core exercises regularly and also used acetaminophen and NSAID pain relievers.  He states that the pain seems to be worsening despite these treatment measures and during exercises he experiences a stabbing pain in his left low back and buttock area.  Standing seems to make the pain worse and the pain seems to be relieved by lying down or sitting.  He denies any loss of bowel or bladder control.  He is currently in a 2 month period of time that he has worked off at the building supply company so he has not been doing any heavy lifting recently.  Despite the rest, exercises, pain medications the pain does not seem to be improving at all.    The patient does admit to smoking approximately half pack per day.  He has had a cold recently and stopped smoking while he was not feeling well.  He is hopeful he will not resume smoking at all as he does wish to quit.    He is concerned about the possibility of diabetes.  His mother is worried that the tingling that he experiences in his left buttock and upper leg could be a sign of diabetes.  He denies any polyuria or polydipsia.    Review of Systems: If positive, the following ROS is bolded:  Constitutional: Fever, chills, night sweats fainting, unexplained weight change  Eyes: Visual changes, eye pain, double vision  HENT: Changes in hearing, ear pain, tinnitus, " "hoarseness, difficulty swallowing  Respiratory: Wheeze, shortness of breath, cough, and exercise intolerance   Cardiovascular: Chest pain, dyspnea, tachycardia, palpitations, syncope, dizziness or lightheadedness  Gastrointestinal: Nausea, vomiting, diarrhea, dyspepsia, irregular BMs, and melena   Genitourinary: Dysuria, frequency, hematuria, nocturia  Integumentary: Pruritis, rashes, lesions, wounds   Musculoskeletal he does endorse a backache/back pain, weakness, joint pain/stiffness, leg pain  Neurological: Changes in balance, mental status, paresthesias, weakness, headache  Behavioral/Psych: Difficulty concentrating, excessive moodiness, depression or anxiety, insomnia    Current Scheduled Meds:  Outpatient Encounter Prescriptions as of 1/30/2017   Medication Sig Dispense Refill     acetaminophen (TYLENOL) 500 MG tablet Take 500 mg by mouth every 6 (six) hours as needed for pain.       DM/P-EPHED/ACETAMINOPH/DOXYLAM (NYQUIL D ORAL) Take by mouth.       naproxen (NAPROSYN) 500 MG tablet Take 1 tablet (500 mg total) by mouth 2 (two) times a day with meals. 60 tablet 2     No facility-administered encounter medications on file as of 1/30/2017.      History reviewed. No pertinent past medical history.  Past Surgical History   Procedure Laterality Date     Nose surgery       after broken nose      Social History   Substance Use Topics     Smoking status: Current Every Day Smoker     Packs/day: 0.50     Types: Cigarettes     Start date: 1/30/2010     Smokeless tobacco: None     Alcohol use No       Objective / Physical Examination:  Vitals:    01/30/17 0820   BP: 120/82   Patient Site: Left Arm   Patient Position: Sitting   Cuff Size: Adult Regular   Pulse: 64   Weight: 200 lb 12.8 oz (91.1 kg)   Height: 5' 7.5\" (1.715 m)     Wt Readings from Last 3 Encounters:   01/30/17 200 lb 12.8 oz (91.1 kg)     Body mass index is 30.99 kg/(m^2).     General Appearance: Alert and oriented, cooperative, affect appropriate, speech " clear, in no apparent distress    Lungs: Clear to auscultation bilaterally. Normal inspiratory and expiratory effort  Cardiovascular: Regular rate, normal S1, S2  Back: symmetrical.  He has L5 and S1 tenderness to palpation.  There is SI joint tenderness.  Negative straight leg raises bilaterally.  Gait is normal.  Balance is normal.    Orders Placed This Encounter   Procedures     MR Lumbar Spine Without Contrast     Tdap vaccine,  6yo or older,  IM     Glucose     JIC LAV   Followup: Return if symptoms worsen or fail to improve. earlier if needed. Will notify patient of results and plan based on MRI results. Consider ROSEANN, PT, chiropractor       Samantha Easton, CNP  [facility]

## 2021-09-19 ENCOUNTER — HEALTH MAINTENANCE LETTER (OUTPATIENT)
Age: 31
End: 2021-09-19

## 2022-01-09 ENCOUNTER — HEALTH MAINTENANCE LETTER (OUTPATIENT)
Age: 32
End: 2022-01-09

## 2022-11-21 ENCOUNTER — HEALTH MAINTENANCE LETTER (OUTPATIENT)
Age: 32
End: 2022-11-21

## 2023-04-16 ENCOUNTER — HEALTH MAINTENANCE LETTER (OUTPATIENT)
Age: 33
End: 2023-04-16

## (undated) RX ORDER — LIDOCAINE HYDROCHLORIDE 10 MG/ML
INJECTION, SOLUTION EPIDURAL; INFILTRATION; INTRACAUDAL; PERINEURAL
Status: DISPENSED
Start: 2019-08-30

## (undated) RX ORDER — BUPIVACAINE HYDROCHLORIDE 5 MG/ML
INJECTION, SOLUTION EPIDURAL; INTRACAUDAL
Status: DISPENSED
Start: 2019-08-30

## (undated) RX ORDER — TRIAMCINOLONE ACETONIDE 40 MG/ML
INJECTION, SUSPENSION INTRA-ARTICULAR; INTRAMUSCULAR
Status: DISPENSED
Start: 2019-08-30